# Patient Record
Sex: FEMALE | Race: WHITE
[De-identification: names, ages, dates, MRNs, and addresses within clinical notes are randomized per-mention and may not be internally consistent; named-entity substitution may affect disease eponyms.]

---

## 2024-06-14 ENCOUNTER — DASHBOARD ENCOUNTERS (OUTPATIENT)
Age: 64
End: 2024-06-14

## 2024-06-14 ENCOUNTER — OFFICE VISIT (OUTPATIENT)
Dept: URBAN - METROPOLITAN AREA CLINIC 96 | Facility: CLINIC | Age: 64
End: 2024-06-14
Payer: COMMERCIAL

## 2024-06-14 ENCOUNTER — LAB OUTSIDE AN ENCOUNTER (OUTPATIENT)
Dept: URBAN - METROPOLITAN AREA CLINIC 96 | Facility: CLINIC | Age: 64
End: 2024-06-14

## 2024-06-14 VITALS
WEIGHT: 180 LBS | DIASTOLIC BLOOD PRESSURE: 96 MMHG | BODY MASS INDEX: 30.73 KG/M2 | HEART RATE: 84 BPM | TEMPERATURE: 97.7 F | SYSTOLIC BLOOD PRESSURE: 150 MMHG | HEIGHT: 64 IN

## 2024-06-14 DIAGNOSIS — Z85.038 PERSONAL HISTORY OF COLON CANCER: ICD-10-CM

## 2024-06-14 PROBLEM — 429699009: Status: ACTIVE | Noted: 2024-06-14

## 2024-06-14 PROCEDURE — 99203 OFFICE O/P NEW LOW 30 MIN: CPT | Performed by: INTERNAL MEDICINE

## 2024-06-14 RX ORDER — GABAPENTIN 300 MG/1
1 CAPSULE CAPSULE ORAL ONCE A DAY
Status: ACTIVE | COMMUNITY

## 2024-06-14 RX ORDER — LEVOTHYROXINE SODIUM 50 UG/1
1 TABLET IN THE MORNING ON AN EMPTY STOMACH TABLET ORAL ONCE A DAY
Status: ACTIVE | COMMUNITY

## 2024-06-14 RX ORDER — ROSUVASTATIN CALCIUM 20 MG/1
1 TABLET TABLET, FILM COATED ORAL ONCE A DAY
Status: ACTIVE | COMMUNITY

## 2024-06-14 RX ORDER — POLYETHYLENE GLYCOL 3350, SODIUM SULFATE ANHYDROUS, SODIUM BICARBONATE, SODIUM CHLORIDE, POTASSIUM CHLORIDE 236; 22.74; 6.74; 5.86; 2.97 G/4L; G/4L; G/4L; G/4L; G/4L
AS DIRECTED POWDER, FOR SOLUTION ORAL
Qty: 4000 ML | Refills: 0 | OUTPATIENT
Start: 2024-06-14 | End: 2024-06-15

## 2024-06-14 RX ORDER — DESVENLAFAXINE SUCCINATE 100 MG/1
1 TABLET TABLET, EXTENDED RELEASE ORAL ONCE A DAY
Refills: 0 | Status: ACTIVE | COMMUNITY
Start: 1900-01-01

## 2024-06-14 RX ORDER — GABAPENTIN 600 MG/1
TAKE 3 TABLETS (1,800 MG) BY ORAL ROUTE ONCE DAILY WITH THE EVENING MEAL TABLET, FILM COATED ORAL 1
Qty: 0 | Refills: 0 | Status: ON HOLD | COMMUNITY
Start: 1900-01-01

## 2024-06-14 RX ORDER — ONDANSETRON 4 MG/1
2 TABLETS TABLET, FILM COATED ORAL
Qty: 2 TABLETS | Refills: 0 | OUTPATIENT
Start: 2024-06-14

## 2024-06-14 RX ORDER — OXYCODONE HYDROCHLORIDE AND ACETAMINOPHEN 10; 325 MG/1; MG/1
1 TABLET AS NEEDED TABLET ORAL
Status: ACTIVE | COMMUNITY

## 2024-06-14 RX ORDER — TIZANIDINE 4 MG/1
1 TABLET AT BEDTIME AS NEEDED TABLET ORAL ONCE A DAY
Status: ACTIVE | COMMUNITY

## 2024-06-14 RX ORDER — MORPHINE SULFATE 15 MG/1
1 TABLET AS NEEDED TABLET ORAL
Status: ACTIVE | COMMUNITY

## 2024-06-14 RX ORDER — ESTRADIOL 0.1 MG/D
1 PATCH TO SKIN PATCH TRANSDERMAL
Status: ACTIVE | COMMUNITY

## 2024-06-14 NOTE — HPI-TODAY'S VISIT:
This is a 63-year-old female referred by Dr Jeramy Colbert for GI consultation and a copy will be sent to the referring provider.  Patient had a remote history of colon cancer and resection of the cancer in 2015.  She was under the care of Dr. Kc.  Patient had complained of some dysphagia at that time.  Patient was seen in 2019 by her PA Geo.  She ordered endoscopy and colonoscopy.  Endoscopy was done by Dr. Lynch and [facility] on August 20, 2019.  This revealed some plaques of the esophagus and a lower esophageal stricture that was dilated up to 15 mm with a CRE balloon.  The plaques were biopsied.  Pathology showed acute esophagitis with fungal forms and she was treated for Candida.  Colonoscopy was done August 21, 2019 for history of colon cancer and this revealed internal hemorrhoids and otherwise normal exam.  The anastomosis appeared normal as well.  I recommended repeat exam in 3 years but it does not appear that the patient was able to do that exam in 2022. Pt is retired and overall doing ok. Had to leave the film industry. Pt is rennovating her house. Pt will be spending time at her [place] property near [place]. Tara can work remotely. Pt denies dysphagia or reflux. Pt is moving her bowels daily and denies constipation. Pt sees pain managment for her narcotics.

## 2024-08-27 ENCOUNTER — CLAIMS CREATED FROM THE CLAIM WINDOW (OUTPATIENT)
Dept: URBAN - METROPOLITAN AREA SURGERY CENTER 16 | Facility: SURGERY CENTER | Age: 64
End: 2024-08-27
